# Patient Record
Sex: MALE | Race: WHITE | NOT HISPANIC OR LATINO | Employment: FULL TIME | URBAN - METROPOLITAN AREA
[De-identification: names, ages, dates, MRNs, and addresses within clinical notes are randomized per-mention and may not be internally consistent; named-entity substitution may affect disease eponyms.]

---

## 2023-09-26 ENCOUNTER — HOSPITAL ENCOUNTER (EMERGENCY)
Facility: MEDICAL CENTER | Age: 38
End: 2023-09-26
Attending: EMERGENCY MEDICINE

## 2023-09-26 ENCOUNTER — APPOINTMENT (OUTPATIENT)
Dept: RADIOLOGY | Facility: MEDICAL CENTER | Age: 38
End: 2023-09-26
Attending: EMERGENCY MEDICINE

## 2023-09-26 VITALS
WEIGHT: 244.49 LBS | HEART RATE: 78 BPM | TEMPERATURE: 97.4 F | SYSTOLIC BLOOD PRESSURE: 145 MMHG | DIASTOLIC BLOOD PRESSURE: 78 MMHG | RESPIRATION RATE: 16 BRPM | OXYGEN SATURATION: 96 %

## 2023-09-26 DIAGNOSIS — I82.461 ACUTE DEEP VEIN THROMBOSIS (DVT) OF CALF MUSCLE VEIN OF RIGHT LOWER EXTREMITY (HCC): ICD-10-CM

## 2023-09-26 DIAGNOSIS — M79.604 RIGHT LEG PAIN: ICD-10-CM

## 2023-09-26 LAB
ALBUMIN SERPL BCP-MCNC: 4.7 G/DL (ref 3.2–4.9)
ALBUMIN/GLOB SERPL: 1.4 G/DL
ALP SERPL-CCNC: 75 U/L (ref 30–99)
ALT SERPL-CCNC: 18 U/L (ref 2–50)
ANION GAP SERPL CALC-SCNC: 14 MMOL/L (ref 7–16)
AST SERPL-CCNC: 17 U/L (ref 12–45)
BASOPHILS # BLD AUTO: 0.5 % (ref 0–1.8)
BASOPHILS # BLD: 0.05 K/UL (ref 0–0.12)
BILIRUB SERPL-MCNC: 0.4 MG/DL (ref 0.1–1.5)
BUN SERPL-MCNC: 12 MG/DL (ref 8–22)
CALCIUM ALBUM COR SERPL-MCNC: 8.9 MG/DL (ref 8.5–10.5)
CALCIUM SERPL-MCNC: 9.5 MG/DL (ref 8.5–10.5)
CHLORIDE SERPL-SCNC: 100 MMOL/L (ref 96–112)
CO2 SERPL-SCNC: 23 MMOL/L (ref 20–33)
CREAT SERPL-MCNC: 0.92 MG/DL (ref 0.5–1.4)
EOSINOPHIL # BLD AUTO: 0.11 K/UL (ref 0–0.51)
EOSINOPHIL NFR BLD: 1 % (ref 0–6.9)
ERYTHROCYTE [DISTWIDTH] IN BLOOD BY AUTOMATED COUNT: 40 FL (ref 35.9–50)
GFR SERPLBLD CREATININE-BSD FMLA CKD-EPI: 107 ML/MIN/1.73 M 2
GLOBULIN SER CALC-MCNC: 3.3 G/DL (ref 1.9–3.5)
GLUCOSE SERPL-MCNC: 117 MG/DL (ref 65–99)
HCT VFR BLD AUTO: 47.8 % (ref 42–52)
HGB BLD-MCNC: 16.4 G/DL (ref 14–18)
IMM GRANULOCYTES # BLD AUTO: 0.03 K/UL (ref 0–0.11)
IMM GRANULOCYTES NFR BLD AUTO: 0.3 % (ref 0–0.9)
LYMPHOCYTES # BLD AUTO: 2.04 K/UL (ref 1–4.8)
LYMPHOCYTES NFR BLD: 18.5 % (ref 22–41)
MCH RBC QN AUTO: 30.3 PG (ref 27–33)
MCHC RBC AUTO-ENTMCNC: 34.3 G/DL (ref 32.3–36.5)
MCV RBC AUTO: 88.4 FL (ref 81.4–97.8)
MONOCYTES # BLD AUTO: 0.77 K/UL (ref 0–0.85)
MONOCYTES NFR BLD AUTO: 7 % (ref 0–13.4)
NEUTROPHILS # BLD AUTO: 8.01 K/UL (ref 1.82–7.42)
NEUTROPHILS NFR BLD: 72.7 % (ref 44–72)
NRBC # BLD AUTO: 0 K/UL
NRBC BLD-RTO: 0 /100 WBC (ref 0–0.2)
PLATELET # BLD AUTO: 210 K/UL (ref 164–446)
PMV BLD AUTO: 9.8 FL (ref 9–12.9)
POTASSIUM SERPL-SCNC: 3.6 MMOL/L (ref 3.6–5.5)
PROT SERPL-MCNC: 8 G/DL (ref 6–8.2)
RBC # BLD AUTO: 5.41 M/UL (ref 4.7–6.1)
SODIUM SERPL-SCNC: 137 MMOL/L (ref 135–145)
WBC # BLD AUTO: 11 K/UL (ref 4.8–10.8)

## 2023-09-26 PROCEDURE — 99283 EMERGENCY DEPT VISIT LOW MDM: CPT

## 2023-09-26 PROCEDURE — 93971 EXTREMITY STUDY: CPT | Mod: RT

## 2023-09-26 PROCEDURE — 700102 HCHG RX REV CODE 250 W/ 637 OVERRIDE(OP): Performed by: EMERGENCY MEDICINE

## 2023-09-26 PROCEDURE — 85025 COMPLETE CBC W/AUTO DIFF WBC: CPT

## 2023-09-26 PROCEDURE — A9270 NON-COVERED ITEM OR SERVICE: HCPCS | Performed by: EMERGENCY MEDICINE

## 2023-09-26 PROCEDURE — 36415 COLL VENOUS BLD VENIPUNCTURE: CPT

## 2023-09-26 PROCEDURE — 80053 COMPREHEN METABOLIC PANEL: CPT

## 2023-09-26 RX ADMIN — RIVAROXABAN 15 MG: 15 TABLET, FILM COATED ORAL at 21:54

## 2023-09-26 ASSESSMENT — PAIN DESCRIPTION - PAIN TYPE: TYPE: ACUTE PAIN

## 2023-09-27 ENCOUNTER — DOCUMENTATION (OUTPATIENT)
Dept: VASCULAR LAB | Facility: MEDICAL CENTER | Age: 38
End: 2023-09-27

## 2023-09-27 NOTE — ED PROVIDER NOTES
ED Provider Note    CHIEF COMPLAINT  Chief Complaint   Patient presents with    Leg Swelling     R calf pain and swelling started Thursday. Here to r/o a blood clot     EXTERNAL RECORDS REVIEWED  none    HPI/ROS  LIMITATION TO HISTORY   Select: : None  OUTSIDE HISTORIAN(S):  none    Brandyn Angelina Frances is a 38 y.o. male who presents to the emergency room for evaluation of right-sided calf discomfort and swelling.  He believes this started on Thursday.  Patient just recently had a 13-hour return flight from [facility] and upon return he had noticed some pain and discomfort on the posterior aspect of his calf and knee.  He has had no recent trauma, no injuries, no bruising or discoloration of the lower leg.  He denies any other joint or lower leg discomfort beyond a slight discomfort in the calf itself.  He does move around a lot and is unsure if this is related to a muscular strain but is concerned about the possibility of a blood clot.  No prior history of DVT or PE, no known familial disease, no past medical history of coagulopathy    PAST MEDICAL HISTORY   none    SURGICAL HISTORY  patient denies any surgical history    FAMILY HISTORY  History reviewed. No pertinent family history.    SOCIAL HISTORY  Social History     Tobacco Use    Smoking status: Never    Smokeless tobacco: Never   Vaping Use    Vaping Use: Never used   Substance and Sexual Activity    Alcohol use: Not Currently    Drug use: Yes     Types: Inhaled     Comment: marijuana    Sexual activity: Not on file       CURRENT MEDICATIONS  Home Medications       Reviewed by Gabbie Dao R.N. (Registered Nurse) on 09/26/23 at 1828  Med List Status: Complete     Medication Last Dose Status        Patient Roscoe Taking any Medications                           ALLERGIES  No Known Allergies    PHYSICAL EXAM  VITAL SIGNS: BP (!) 145/78   Pulse 78   Temp 36.3 °C (97.4 °F) (Temporal)   Resp 16   Wt 111 kg (244 lb 7.8 oz)   SpO2 96%    Genl: M sitting  in chair comfortably, speaking clearly, appears in no acute distress   Head: NC/AT   ENT: Mucous membranes moist, posterior pharynx clear, uvula midline, nares patent bilaterally   Pulmonary: Lungs are clear to auscultation bilaterally  CV:  RRR, no murmur appreciated, pulses 2+ in both upper and lower extremities,  Abdomen: soft, NT/ND; no rebound/guarding  Musculoskeletal: Pain free ROM of the neck. Moving upper and lower extremities in spontaneous and coordinated fashion  Right Lower Extremity  - Skin: Slight amount of swelling is noted in the posterior aspect of the calf with nondescript discomfort up towards the popliteal space.  There is no erythema, no warmth, no induration, no significant asymmetry.  No evidence of abrasions, lacerations or ecchymosis  - Motor: Full ROM at hip, knee, ankle; 5/5 ankle dorsal/plantar flexion, EHL/FHL intact  - Sensation intact to superficial/deep peroneal, tibial, saphenous, sural nerves  - 2+ dorsalis pedis and posterior tibialis, cap refill < 2 seconds x 5 digits  ?  DIAGNOSTIC STUDIES / PROCEDURES    LABS  Labs Reviewed   CBC WITH DIFFERENTIAL - Abnormal; Notable for the following components:       Result Value    WBC 11.0 (*)     Neutrophils-Polys 72.70 (*)     Lymphocytes 18.50 (*)     Neutrophils (Absolute) 8.01 (*)     All other components within normal limits   COMP METABOLIC PANEL - Abnormal; Notable for the following components:    Glucose 117 (*)     All other components within normal limits   ESTIMATED GFR     RADIOLOGY  I have independently interpreted the diagnostic imaging associated with this visit and am waiting the final reading from the radiologist.   My preliminary interpretation is as follows: Peers that there is an acute occlusive DVT at the gastroc veins to the mid proximal calf with extension towards the popliteal vein.  Radiologist interpretation:   US-EXTREMITY VENOUS LOWER UNILAT RIGHT          Acute, occlusive deep venous thrombus in the paired  gastrocnemius veins    from mid to proximal calf with extension into the popliteal vein where it    is partially occluded.       COURSE & MEDICAL DECISION MAKING    ED Observation Status? No; Patient does not meet criteria for ED Observation.     INITIAL ASSESSMENT, COURSE AND PLAN  Care Narrative: Presents emergency room for symptoms as described above.  Patient has stable vital signs, has some swelling and discomfort in the right calf that could be remnants of a Baker's cyst, soleus or gastroc muscular injury, hematoma though cannot exclude the possibility of a DVT.  He is primarily here for this and has stable vital signs with no other constitutional complaints, no chest pain, no headaches, no abdominal pain and no prior history or other concerning historical factors.    Ultrasound was obtained and the patient is not in significant pain or distress.  Ultrasound came back showing an acute DVT.  Patient was then amenable to blood work which did not show any evidence of gross kidney dysfunction or liver dysfunction.  He is given initial dose of rivaroxaban, consultation for establishing care with our anticoagulation clinic and the need for further anticoagulation between usually 3 to 6 months before follow-up ultrasound in unprovoked spontaneous DVT was no prior history.  At this time the patient will be discharged home in stable condition with strict return precautions and verbalized understanding of need to take this medication as this could potentially cause further life-threatening conditions or death.    DISPOSITION AND DISCUSSIONS  I have discussed management of the patient with the following physicians and BILL's:  none    Discussion of management with other [facility] or appropriate source(s): Pharmacy regarding dosing      Escalation of care considered, and ultimately not performed:acute inpatient care management, however at this time, the patient is most appropriate for outpatient management    Barriers to care at  this time, including but not limited to: Patient does not have established PCP. He lives in [facility] and will follow up there with his providers    Decision tools and prescription drugs considered including, but not limited to:  anticoagulation .    FINAL DIAGNOSIS  1. Acute deep vein thrombosis (DVT) of calf muscle vein of right lower extremity (HCC)    2. Right leg pain      Electronically signed by: Jose Vega M.D., 9/26/2023 7:59 PM

## 2023-09-27 NOTE — ED NOTES
Patient ambulated without assistance to room. Patient on monitor, with call light in reach. Chart up for next available ERP.

## 2023-09-27 NOTE — PROGRESS NOTES
Received anticoag referral from ER team. However pt lives in [facility]. Will defer OAC management to his provider in [facility].    Melia Cheema, PharmD

## 2023-09-27 NOTE — ED TRIAGE NOTES
Ambulates to triage  Chief Complaint   Patient presents with    Leg Swelling     R calf pain and swelling started Thursday. Here to r/o a blood clot     Pt was on a 13 hour plane flight 1 week ago, leg pain and swelling started a few days after that.

## 2023-09-27 NOTE — DISCHARGE PLANNING
Patient calls this AM re: enma referral, pt states he called this AM and they told him best practice is a week of administration prior to first visit. Patient is heading home and wanted to know if referral will cover in [place]. Patient notified that referral was unspecified and should cover in . Patient states understanding and will seek a provider once home.

## 2023-09-27 NOTE — ED NOTES
Pt medicated per MAR, education provided, pt verbalized understanding.    ED tech at bedside for lab draw.

## 2023-09-27 NOTE — ED NOTES
Discharge education provided by ERP. Discharge paperwork signed by pt. Prescription to be picked up by pt. All questions answered. All belongings with pt. Pt ambulated to lobby unassisted with steady gait.

## 2023-09-27 NOTE — DISCHARGE INSTRUCTIONS
FINDINGS:   Right lower extremity.    Acute, occlusive deep venous thrombus in the paired gastrocnemius veins    from mid to proximal calf with extension into the popliteal vein where it    is partially occluded.    All other veins demonstrate complete color filling and compressibility with    normal venous flow dynamics including spontaneous flow and respiratory    phasicity.